# Patient Record
Sex: MALE | Race: WHITE | Employment: FULL TIME | URBAN - METROPOLITAN AREA
[De-identification: names, ages, dates, MRNs, and addresses within clinical notes are randomized per-mention and may not be internally consistent; named-entity substitution may affect disease eponyms.]

---

## 2018-03-07 ENCOUNTER — HOSPITAL ENCOUNTER (EMERGENCY)
Age: 33
Discharge: HOME | End: 2018-03-07
Attending: EMERGENCY MEDICINE

## 2018-03-07 VITALS
BODY MASS INDEX: 26.35 KG/M2 | SYSTOLIC BLOOD PRESSURE: 133 MMHG | OXYGEN SATURATION: 99 % | HEIGHT: 72 IN | HEART RATE: 102 BPM | WEIGHT: 194.56 LBS | TEMPERATURE: 98.2 F | RESPIRATION RATE: 18 BRPM | DIASTOLIC BLOOD PRESSURE: 83 MMHG

## 2018-03-07 DIAGNOSIS — M43.6 TORTICOLLIS: Primary | ICD-10-CM

## 2018-03-07 PROCEDURE — 6360000002 HC RX W HCPCS: Performed by: NURSE PRACTITIONER

## 2018-03-07 PROCEDURE — 99283 EMERGENCY DEPT VISIT LOW MDM: CPT

## 2018-03-07 PROCEDURE — 96372 THER/PROPH/DIAG INJ SC/IM: CPT

## 2018-03-07 RX ORDER — IBUPROFEN 800 MG/1
800 TABLET ORAL EVERY 8 HOURS PRN
Qty: 20 TABLET | Refills: 0 | Status: SHIPPED | OUTPATIENT
Start: 2018-03-07

## 2018-03-07 RX ORDER — ORPHENADRINE CITRATE 30 MG/ML
60 INJECTION INTRAMUSCULAR; INTRAVENOUS ONCE
Status: COMPLETED | OUTPATIENT
Start: 2018-03-07 | End: 2018-03-07

## 2018-03-07 RX ORDER — CYCLOBENZAPRINE HCL 10 MG
10 TABLET ORAL 3 TIMES DAILY PRN
Qty: 15 TABLET | Refills: 0 | Status: SHIPPED | OUTPATIENT
Start: 2018-03-07 | End: 2018-03-12

## 2018-03-07 RX ORDER — KETOROLAC TROMETHAMINE 30 MG/ML
60 INJECTION, SOLUTION INTRAMUSCULAR; INTRAVENOUS ONCE
Status: COMPLETED | OUTPATIENT
Start: 2018-03-07 | End: 2018-03-07

## 2018-03-07 RX ADMIN — ORPHENADRINE CITRATE 60 MG: 30 INJECTION INTRAMUSCULAR; INTRAVENOUS at 20:12

## 2018-03-07 RX ADMIN — KETOROLAC TROMETHAMINE 60 MG: 30 INJECTION, SOLUTION INTRAMUSCULAR at 20:09

## 2018-03-07 ASSESSMENT — PAIN SCALES - GENERAL
PAINLEVEL_OUTOF10: 8
PAINLEVEL_OUTOF10: 8

## 2018-03-07 ASSESSMENT — PAIN DESCRIPTION - ORIENTATION: ORIENTATION: RIGHT

## 2018-03-07 ASSESSMENT — PAIN DESCRIPTION - DESCRIPTORS: DESCRIPTORS: THROBBING;SHARP;ACHING

## 2018-03-07 ASSESSMENT — PAIN DESCRIPTION - LOCATION: LOCATION: SHOULDER

## 2018-03-07 ASSESSMENT — PAIN DESCRIPTION - FREQUENCY: FREQUENCY: CONTINUOUS

## 2018-03-07 ASSESSMENT — PAIN DESCRIPTION - PAIN TYPE: TYPE: ACUTE PAIN

## 2018-03-08 NOTE — ED NOTES
Pt states he started having sharp pain in his right shoulder pain that radiates down to his right wrist.  Pt also states he has carpal tunnel. Pt has brisk capillary refill, limited ROM on his right side, radial pulse is strong and equal.  Skin is pink and dry, no open areas or color changes present. Pt is a/ox3, respirations are even and non labored.        Anna Tate RN  03/07/18 2017       Anna Tate RN  03/07/18 2020

## 2018-03-08 NOTE — ED PROVIDER NOTES
[address] [facility] ED  Emergency Department  Faculty Attestation     Pt Name: Cara Quinones  MRN: [id number]  [place] 1985  Date of evaluation: 3/7/18    I was personally available for consultation in the Emergency Department. Have reviewed everything on the chart that is available and agree with the documentation provided by the [facility] AND [facility], including discussion about the assessment, treatment plan and disposition. Cara Quinones is a 33 y.o. male who presents with Shoulder Pain (right onset 2 days, no injury) and Neck Pain      Vitals:   Vitals:    03/07/18 1941   BP: 133/83   Pulse: 102   Resp: 18   Temp: 98.2 °F (36.8 °C)   TempSrc: Oral   SpO2: 99%   Weight: 194 lb 9 oz (88.3 kg)   Height: 6' (1.829 m)       Impression:   1.  Stan Seth MD  Attending Emergency Physician      (Please note that portions of this note were completed with a voice recognition program.  Efforts were made to edit the dictations but occasionally words are mis-transcribed.)        Fatoumata Jamison MD  03/07/18 9169

## 2018-03-08 NOTE — ED PROVIDER NOTES
systems was reviewed and negative. PHYSICAL EXAM    (up to 7 for level 4, 8 or more for level 5)     ED Triage Vitals [03/07/18 1941]   BP Temp Temp Source Pulse Resp SpO2 Height Weight   133/83 98.2 °F (36.8 °C) Oral 102 18 99 % 6' (1.829 m) 194 lb 9 oz (88.3 kg)       Physical Exam   Constitutional: He is oriented to person, place, and time. He appears well-developed and well-nourished. HENT:   Head: Normocephalic and atraumatic. Right Ear: External ear normal.   Left Ear: External ear normal.   Nose: Nose normal.   Mouth/Throat: Oropharynx is clear and moist.   Eyes: Conjunctivae and EOM are normal. Pupils are equal, round, and reactive to light. Neck: Full passive range of motion without pain. Muscular tenderness present. No spinous process tenderness present. Decreased range of motion present. Patient exhibits pain on palpation to the right side of the neck. No midline cervical tenderness. He has difficulty flexing his head backward. Pain is also aggravated when he lifts his right arm. Pulmonary/Chest: Effort normal. No respiratory distress. Neurological: He is alert and oriented to person, place, and time. He has normal strength and normal reflexes. No cranial nerve deficit or sensory deficit. Skin: Skin is warm and dry. Psychiatric: He has a normal mood and affect.  His behavior is normal. Judgment normal.         DIAGNOSTIC RESULTS     EKG: All EKG's are interpreted by the Emergency Department Physician who either signs or Co-signs this chart in the absence of a cardiologist.        RADIOLOGY:   Non-plain film images such as CT, Ultrasound and MRI are read by the radiologist. Plain radiographic images are visualized and preliminarily interpreted by the emergency physician with the below findings:        Interpretation per the Radiologist below, if available at the time of this note:    No orders to display         ED BEDSIDE ULTRASOUND:   Performed by ED Physician - none    LABS:  Labs Reviewed - No data to display    All other labs were within normal range or not returned as of this dictation. EMERGENCY DEPARTMENT COURSE and DIFFERENTIAL DIAGNOSIS/MDM:   Examination shows right-sided neck tenderness to palpation. No midline cervical tenderness. Patient exhibits a tightness in the right side of his neck. Pain radiates to his shoulder. No neurological deficits. He is afebrile. He was given Norflex and Toradol in the ED. Patient requested a wrist splint for his carpal tunnel. Patient states he's had carpal town his right wrist before. A Velcro wrist splint was applied to the right wrist.  Splint checked by provider. Patient is neurovascularly intact. He'll be discharged with Flexeril and Motrin for torticollis. He is provided with the [facility] primary care number to follow-up with as needed. Return to ED if symptoms worsen. Vitals:    Vitals:    03/07/18 1941   BP: 133/83   Pulse: 102   Resp: 18   Temp: 98.2 °F (36.8 °C)   TempSrc: Oral   SpO2: 99%   Weight: 194 lb 9 oz (88.3 kg)   Height: 6' (1.829 m)         CONSULTS:  None    PROCEDURES:  Procedures    FINAL IMPRESSION      1. Torticollis          DISPOSITION/PLAN   DISPOSITION Decision To Discharge 03/07/2018 08:06:44 PM      PATIENT REFERRED TO:     Call [phone] to schedule appointment with a primary care doctor.     As needed    [facility] ED  [address] [facility]  [phone]    If symptoms worsen      DISCHARGE MEDICATIONS:     New Prescriptions    CYCLOBENZAPRINE (FLEXERIL) 10 MG TABLET    Take 1 tablet by mouth 3 times daily as needed for Muscle spasms    IBUPROFEN (ADVIL;MOTRIN) 800 MG TABLET    Take 1 tablet by mouth every 8 hours as needed for Pain     Electronically signed by Cara Chung NP on 3/7/2018 at 8:11 PM           Cara Chung NP  03/07/18 2012